# Patient Record
Sex: FEMALE | Race: WHITE | NOT HISPANIC OR LATINO | Employment: OTHER | URBAN - METROPOLITAN AREA
[De-identification: names, ages, dates, MRNs, and addresses within clinical notes are randomized per-mention and may not be internally consistent; named-entity substitution may affect disease eponyms.]

---
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
## 2020-10-18 ENCOUNTER — OFFICE VISIT (OUTPATIENT)
Dept: URGENT CARE | Facility: CLINIC | Age: 20
End: 2020-10-18
Payer: MEDICAID

## 2020-10-18 VITALS
HEART RATE: 98 BPM | TEMPERATURE: 98 F | WEIGHT: 150 LBS | SYSTOLIC BLOOD PRESSURE: 122 MMHG | HEIGHT: 67 IN | DIASTOLIC BLOOD PRESSURE: 76 MMHG | BODY MASS INDEX: 23.54 KG/M2 | OXYGEN SATURATION: 99 % | RESPIRATION RATE: 18 BRPM

## 2020-10-18 DIAGNOSIS — J06.9 UPPER RESPIRATORY VIRUS: Primary | ICD-10-CM

## 2020-10-18 DIAGNOSIS — H65.01 NON-RECURRENT ACUTE SEROUS OTITIS MEDIA OF RIGHT EAR: ICD-10-CM

## 2020-10-18 PROCEDURE — 99499 NO LOS: ICD-10-PCS | Mod: S$GLB,,, | Performed by: PHYSICIAN ASSISTANT

## 2020-10-18 PROCEDURE — 99214 OFFICE O/P EST MOD 30 MIN: CPT | Mod: S$GLB,,, | Performed by: PHYSICIAN ASSISTANT

## 2020-10-18 PROCEDURE — 99499 UNLISTED E&M SERVICE: CPT | Mod: S$GLB,,, | Performed by: PHYSICIAN ASSISTANT

## 2020-10-18 PROCEDURE — 99214 PR OFFICE/OUTPT VISIT, EST, LEVL IV, 30-39 MIN: ICD-10-PCS | Mod: S$GLB,,, | Performed by: PHYSICIAN ASSISTANT

## 2020-10-18 RX ORDER — LIDOCAINE HYDROCHLORIDE 20 MG/ML
SOLUTION OROPHARYNGEAL EVERY 6 HOURS
Qty: 100 ML | Refills: 0 | Status: SHIPPED | OUTPATIENT
Start: 2020-10-18 | End: 2020-10-25

## 2020-10-18 RX ORDER — PREDNISONE 20 MG/1
20 TABLET ORAL DAILY
Qty: 3 TABLET | Refills: 0 | Status: SHIPPED | OUTPATIENT
Start: 2020-10-18 | End: 2020-10-21

## 2020-10-18 RX ORDER — AMOXICILLIN 875 MG/1
875 TABLET, FILM COATED ORAL 2 TIMES DAILY
Qty: 20 TABLET | Refills: 0 | Status: SHIPPED | OUTPATIENT
Start: 2020-10-18 | End: 2020-10-28

## 2020-10-18 NOTE — PROGRESS NOTES
"Subjective:       Patient ID: Daniella Roa is a 20 y.o. female.    Vitals:  height is 5' 7" (1.702 m) and weight is 68 kg (150 lb). Her temperature is 97.9 °F (36.6 °C). Her blood pressure is 122/76 and her pulse is 98. Her respiration is 18 and oxygen saturation is 99%.     Chief Complaint: Sore Throat (1 week)    Ms. Roa presents for evaluation of ear pain, congestion, sinus pressure, and sore throat.  She has had the symptoms x 1 week.  She denies any fevers, chills, cough, shortness of breath, chest pain, leg swelling, nausea, vomiting, diarrhea.  She has been taking ibuprofen for symptoms with some relief.           Sore Throat   This is a new problem. The current episode started in the past 7 days. The problem has been unchanged. The pain is worse on the right side. There has been no fever. The pain is at a severity of 6/10. The pain is moderate. Associated symptoms include congestion, ear pain and swollen glands. Pertinent negatives include no coughing, ear discharge, headaches, neck pain, shortness of breath, stridor or vomiting. She has had no exposure to strep. She has tried NSAIDs for the symptoms. The treatment provided moderate relief.       Constitution: Negative for chills, sweating, fatigue and fever.   HENT: Positive for ear pain, congestion, sinus pressure and sore throat. Negative for ear discharge, postnasal drip, sinus pain and voice change.    Neck: Negative for neck pain and painful lymph nodes.   Cardiovascular: Negative for chest trauma, chest pain, leg swelling, palpitations, sob on exertion and passing out.   Eyes: Negative for eye redness.   Respiratory: Negative for chest tightness, cough, sputum production, bloody sputum, COPD, shortness of breath, stridor, wheezing and asthma.    Gastrointestinal: Negative for nausea and vomiting.   Musculoskeletal: Negative for muscle ache.   Skin: Negative for rash.   Allergic/Immunologic: Negative for seasonal allergies and asthma. "   Neurological: Negative for dizziness, history of vertigo, light-headedness, passing out, facial drooping, speech difficulty, coordination disturbances, loss of balance and headaches.   Hematologic/Lymphatic: Negative for swollen lymph nodes.       Objective:      Physical Exam   Constitutional: She is oriented to person, place, and time. She appears well-developed. She is cooperative.  Non-toxic appearance. She does not appear ill. No distress.   HENT:   Head: Normocephalic and atraumatic.   Ears:   Right Ear: Hearing, external ear and ear canal normal. Tympanic membrane is erythematous. A middle ear effusion is present.   Left Ear: Hearing, external ear and ear canal normal. Tympanic membrane is not erythematous. A middle ear effusion is present.   Nose: Rhinorrhea present. No mucosal edema or nasal deformity. No epistaxis. Right sinus exhibits no maxillary sinus tenderness and no frontal sinus tenderness. Left sinus exhibits no maxillary sinus tenderness and no frontal sinus tenderness.   Mouth/Throat: Uvula is midline and mucous membranes are normal. No trismus in the jaw. Normal dentition. No uvula swelling. Posterior oropharyngeal erythema present. No oropharyngeal exudate or posterior oropharyngeal edema. Tonsils are 2+ on the right. Tonsils are 2+ on the left. No tonsillar exudate.   Eyes: Conjunctivae and lids are normal. No scleral icterus.   Neck: Trachea normal, full passive range of motion without pain and phonation normal. Neck supple. No neck rigidity. No edema and no erythema present.   Cardiovascular: Normal rate, regular rhythm, normal heart sounds and normal pulses.   Pulmonary/Chest: Effort normal and breath sounds normal. No stridor. No respiratory distress. She has no decreased breath sounds. She has no wheezes. She has no rhonchi. She has no rales.   Abdominal: Normal appearance.   Musculoskeletal: Normal range of motion.         General: No deformity.   Lymphadenopathy:     She has no  cervical adenopathy.   Neurological: She is alert and oriented to person, place, and time. She exhibits normal muscle tone. Coordination normal.   Skin: Skin is warm, dry, intact, not diaphoretic and not pale. Psychiatric: Her speech is normal and behavior is normal. Judgment and thought content normal.   Nursing note and vitals reviewed.        Assessment:       1. Upper respiratory virus    2. Non-recurrent acute serous otitis media of right ear        Plan:         Upper respiratory virus    Non-recurrent acute serous otitis media of right ear    Other orders  -     amoxicillin (AMOXIL) 875 MG tablet; Take 1 tablet (875 mg total) by mouth 2 (two) times daily. for 10 days  Dispense: 20 tablet; Refill: 0  -     predniSONE (DELTASONE) 20 MG tablet; Take 1 tablet (20 mg total) by mouth once daily. for 3 days  Dispense: 3 tablet; Refill: 0  -     lidocaine HCl 2% (LIDOCAINE VISCOUS) 2 % Soln; by Mucous Membrane route every 6 (six) hours. 10 mL swished in the mouth and spit out no more frequently than every 3 hours for 7 days  Dispense: 100 mL; Refill: 0     Discussed risk of steroids with patient, understanding was verbalized.    Diagnoses and plan discussed with the patient, as well as the expected course and duration of her symptoms. All questions and concerns were addressed prior to discharge.  She was advised to follow up with her PCP within 1 week if symptoms do not improve. Emergency department precautions were given. Patient verbalized understanding and was happy with the plan of care.     Patient Instructions   PLEASE READ YOUR DISCHARGE INSTRUCTIONS ENTIRELY AS IT CONTAINS IMPORTANT INFORMATION.  - Rest.    - Drink plenty of fluids.    - Tylenol or Ibuprofen as directed as needed for fever/pain.    - If you were prescribed antibiotics, please take them to completion.  - If you are female and on birth control pills - please use additional methods of contraception to prevent pregnancy while on antibiotics and  for one cycle after.   - If you were prescribed a narcotic medication or muscle relaxer, do not drive or operate heavy equipment or machinery while taking these medications, as they can cause drowsiness.   - If you smoke, please stop smoking.  -You must understand that you've received an Urgent Care treatment only and that you may be released before all your medical problems are known or treated. You, the patient, will    arrange for follow up care as instructed. Please arrange follow up with your primary medical clinic as soon as possible.   - Follow up with your PCP or specialty clinic as directed in the next 1-2 weeks if not improved or as needed.  You can call [phone] to schedule an appointment with the appropriate provider.    - Please return to Urgent Care or to the Emergency Department if your symptoms worsen.    Patient aware and verbalized understanding.    Viral Upper Respiratory Illness (Adult)  You have a viral upper respiratory illness (URI), which is another term for the common cold. This illness is contagious during the first few days. It is spread through the air by coughing and sneezing. It may also be spread by direct contact (touching the sick person and then touching your own eyes, nose, or mouth). Frequent handwashing will decrease risk of spread. Most viral illnesses go away within 7 to 10 days with rest and simple home remedies. Sometimes the illness may last for several weeks. Antibiotics will not kill a virus, and they are generally not prescribed for this condition.    Home care  · If symptoms are severe, rest at home for the first 2 to 3 days. When you resume activity, don't let yourself get too tired.  · Avoid being exposed to cigarette smoke (yours or others).  · You may use acetaminophen or ibuprofen to control pain and fever, unless another medicine was prescribed. (Note: If you have chronic liver or kidney disease, have ever had a stomach ulcer or gastrointestinal bleeding, or  are taking blood-thinning medicines, talk with your healthcare provider before using these medicines.) Aspirin should never be given to anyone under 18 years of age who is ill with a viral infection or fever. It may cause severe liver or brain damage.  · Your appetite may be poor, so a light diet is fine. Avoid dehydration by drinking 6 to 8 glasses of fluids per day (water, soft drinks, juices, tea, or soup). Extra fluids will help loosen secretions in the nose and lungs.  · Over-the-counter cold medicines will not shorten the length of time youre sick, but they may be helpful for the following symptoms: cough, sore throat, and nasal and sinus congestion. (Note: Do not use decongestants if you have high blood pressure.)  Follow-up care  Follow up with your healthcare provider, or as advised.  When to seek medical advice  Call your healthcare provider right away if any of these occur:  · Cough with lots of colored sputum (mucus)  · Severe headache; face, neck, or ear pain  · Difficulty swallowing due to throat pain  · Fever of 100.4°F (38°C)  Call 911, or get immediate medical care  Call emergency services right away if any of these occur:  · Chest pain, shortness of breath, wheezing, or difficulty breathing  · Coughing up blood  · Inability to swallow due to throat pain  Date Last Reviewed: 9/13/2015  © 0220-1826 [employer]. [address] [place] [address]. All rights reserved. This information is not intended as a substitute for professional medical care. Always follow your healthcare professional's instructions.        Middle Ear Infection (Adult)  You have an infection of the middle ear, the space behind the eardrum. This is also called acute otitis media (AOM). Sometimes it is caused by the common cold. This is because congestion can block the internal passage (eustachian tube) that drains fluid from the middle ear. When the middle ear fills with fluid, bacteria can grow there and  cause an infection. Oral antibiotics are used to treat this illness, not ear drops. Symptoms usually start to improve within 1 to 2 days of treatment.    Home care  The following are general care guidelines:  · Finish all of the antibiotic medicine given, even though you may feel better after the first few days.  · You may use over-the-counter medicine, such as acetaminophen or ibuprofen, to control pain and fever, unless something else was prescribed. If you have chronic liver or kidney disease or have ever had a stomach ulcer or gastrointestinal bleeding, talk with your healthcare provider before using these medicines. Do not give aspirin to anyone under 18 years of age who has a fever. It may cause severe illness or death.  Follow-up care  Follow up with your healthcare provider, or as advised, in 2 weeks if all symptoms have not gotten better, or if hearing doesn't go back to normal within 1 month.  When to seek medical advice  Call your healthcare provider right away if any of these occur:  · Ear pain gets worse or does not improve after 3 days of treatment  · Unusual drowsiness or confusion  · Neck pain, stiff neck, or headache  · Fluid or blood draining from the ear canal  · Fever of 100.4°F (38°C) or as advised   · Seizure  Date Last Reviewed: 6/1/2016  © 9734-3908 [employer]. [address] [place] [address], [place] [address]. All rights reserved. This information is not intended as a substitute for professional medical care. Always follow your healthcare professional's instructions.

## 2020-10-18 NOTE — PATIENT INSTRUCTIONS
PLEASE READ YOUR DISCHARGE INSTRUCTIONS ENTIRELY AS IT CONTAINS IMPORTANT INFORMATION.  - Rest.    - Drink plenty of fluids.    - Tylenol or Ibuprofen as directed as needed for fever/pain.    - If you were prescribed antibiotics, please take them to completion.  - If you are female and on birth control pills - please use additional methods of contraception to prevent pregnancy while on antibiotics and for one cycle after.   - If you were prescribed a narcotic medication or muscle relaxer, do not drive or operate heavy equipment or machinery while taking these medications, as they can cause drowsiness.   - If you smoke, please stop smoking.  -You must understand that you've received an Urgent Care treatment only and that you may be released before all your medical problems are known or treated. You, the patient, will    arrange for follow up care as instructed. Please arrange follow up with your primary medical clinic as soon as possible.   - Follow up with your PCP or specialty clinic as directed in the next 1-2 weeks if not improved or as needed.  You can call [phone] to schedule an appointment with the appropriate provider.    - Please return to Urgent Care or to the Emergency Department if your symptoms worsen.    Patient aware and verbalized understanding.    Viral Upper Respiratory Illness (Adult)  You have a viral upper respiratory illness (URI), which is another term for the common cold. This illness is contagious during the first few days. It is spread through the air by coughing and sneezing. It may also be spread by direct contact (touching the sick person and then touching your own eyes, nose, or mouth). Frequent handwashing will decrease risk of spread. Most viral illnesses go away within 7 to 10 days with rest and simple home remedies. Sometimes the illness may last for several weeks. Antibiotics will not kill a virus, and they are generally not prescribed for this condition.    Home care  · If  symptoms are severe, rest at home for the first 2 to 3 days. When you resume activity, don't let yourself get too tired.  · Avoid being exposed to cigarette smoke (yours or others).  · You may use acetaminophen or ibuprofen to control pain and fever, unless another medicine was prescribed. (Note: If you have chronic liver or kidney disease, have ever had a stomach ulcer or gastrointestinal bleeding, or are taking blood-thinning medicines, talk with your healthcare provider before using these medicines.) Aspirin should never be given to anyone under 18 years of age who is ill with a viral infection or fever. It may cause severe liver or brain damage.  · Your appetite may be poor, so a light diet is fine. Avoid dehydration by drinking 6 to 8 glasses of fluids per day (water, soft drinks, juices, tea, or soup). Extra fluids will help loosen secretions in the nose and lungs.  · Over-the-counter cold medicines will not shorten the length of time youre sick, but they may be helpful for the following symptoms: cough, sore throat, and nasal and sinus congestion. (Note: Do not use decongestants if you have high blood pressure.)  Follow-up care  Follow up with your healthcare provider, or as advised.  When to seek medical advice  Call your healthcare provider right away if any of these occur:  · Cough with lots of colored sputum (mucus)  · Severe headache; face, neck, or ear pain  · Difficulty swallowing due to throat pain  · Fever of 100.4°F (38°C)  Call 911, or get immediate medical care  Call emergency services right away if any of these occur:  · Chest pain, shortness of breath, wheezing, or difficulty breathing  · Coughing up blood  · Inability to swallow due to throat pain  Date Last Reviewed: 9/13/2015  © 6360-0574 [employer]. [address] [place] [address], [place] [address]. All rights reserved. This information is not intended as a substitute for professional medical care. Always follow your healthcare  professional's instructions.        Middle Ear Infection (Adult)  You have an infection of the middle ear, the space behind the eardrum. This is also called acute otitis media (AOM). Sometimes it is caused by the common cold. This is because congestion can block the internal passage (eustachian tube) that drains fluid from the middle ear. When the middle ear fills with fluid, bacteria can grow there and cause an infection. Oral antibiotics are used to treat this illness, not ear drops. Symptoms usually start to improve within 1 to 2 days of treatment.    Home care  The following are general care guidelines:  · Finish all of the antibiotic medicine given, even though you may feel better after the first few days.  · You may use over-the-counter medicine, such as acetaminophen or ibuprofen, to control pain and fever, unless something else was prescribed. If you have chronic liver or kidney disease or have ever had a stomach ulcer or gastrointestinal bleeding, talk with your healthcare provider before using these medicines. Do not give aspirin to anyone under 18 years of age who has a fever. It may cause severe illness or death.  Follow-up care  Follow up with your healthcare provider, or as advised, in 2 weeks if all symptoms have not gotten better, or if hearing doesn't go back to normal within 1 month.  When to seek medical advice  Call your healthcare provider right away if any of these occur:  · Ear pain gets worse or does not improve after 3 days of treatment  · Unusual drowsiness or confusion  · Neck pain, stiff neck, or headache  · Fluid or blood draining from the ear canal  · Fever of 100.4°F (38°C) or as advised   · Seizure  Date Last Reviewed: 6/1/2016 © 2000-2017 [employer]. [address] [place] [address], [place] [address]. All rights reserved. This information is not intended as a substitute for professional medical care. Always follow your healthcare professional's instructions.

## 2021-05-10 ENCOUNTER — PATIENT MESSAGE (OUTPATIENT)
Dept: RESEARCH | Facility: HOSPITAL | Age: 21
End: 2021-05-10

## 2023-11-20 PROBLEM — M25.60 RANGE OF MOTION DEFICIT: Status: RESOLVED | Noted: 2018-03-26 | Resolved: 2023-11-20

## 2023-11-20 PROBLEM — L85.8 KERATOSIS PILARIS: Status: ACTIVE | Noted: 2023-11-20

## 2023-11-20 PROBLEM — G43.119 INTRACTABLE MIGRAINE WITH AURA WITHOUT STATUS MIGRAINOSUS: Status: ACTIVE | Noted: 2023-11-20

## 2024-02-19 ENCOUNTER — OFFICE VISIT (OUTPATIENT)
Dept: DERMATOLOGY | Facility: CLINIC | Age: 24
End: 2024-02-19
Payer: OTHER GOVERNMENT

## 2024-02-19 DIAGNOSIS — R22.9 SUBCUTANEOUS NODULE: Primary | ICD-10-CM

## 2024-02-19 DIAGNOSIS — L85.8 KERATOSIS PILARIS RUBRA: ICD-10-CM

## 2024-02-19 PROCEDURE — 99203 OFFICE O/P NEW LOW 30 MIN: CPT | Mod: AQ,S$GLB,, | Performed by: STUDENT IN AN ORGANIZED HEALTH CARE EDUCATION/TRAINING PROGRAM

## 2024-02-19 RX ORDER — TRIAMCINOLONE ACETONIDE 0.25 MG/G
CREAM TOPICAL 2 TIMES DAILY
Qty: 80 G | Refills: 1 | Status: SHIPPED | OUTPATIENT
Start: 2024-02-19

## 2024-02-19 RX ORDER — AMMONIUM LACTATE 12 G/100G
LOTION TOPICAL NIGHTLY
Qty: 225 G | Refills: 1 | Status: SHIPPED | OUTPATIENT
Start: 2024-02-19

## 2024-02-19 NOTE — PROGRESS NOTES
Subjective:      Patient ID:  Daniella Roa is a 23 y.o. female who presents for   Chief Complaint   Patient presents with    Rash     arms     New patient     Patient here today for rash on arms and legs x years. Uses urea lotion, does not help much.     Also complains of spot on right thigh x months. Some tenderness to touch.       Review of Systems   Constitutional:  Negative for fever, chills and fatigue.   Respiratory:  Negative for cough and shortness of breath.    Gastrointestinal:  Negative for nausea and vomiting.   Skin:  Positive for rash.       Objective:   Physical Exam   Constitutional: She appears well-developed and well-nourished.   Neurological: She is alert and oriented to person, place, and time.   Psychiatric: She has a normal mood and affect.   Skin:   Areas Examined (abnormalities noted in diagram):   RUE Inspected  LUE Inspection Performed  RLE Inspected  LLE Inspection Performed            Diagram Legend     Erythematous scaling macule/papule c/w actinic keratosis       Vascular papule c/w angioma      Pigmented verrucoid papule/plaque c/w seborrheic keratosis      Yellow umbilicated papule c/w sebaceous hyperplasia      Irregularly shaped tan macule c/w lentigo     1-2 mm smooth white papules consistent with Milia      Movable subcutaneous cyst with punctum c/w epidermal inclusion cyst      Subcutaneous movable cyst c/w pilar cyst      Firm pink to brown papule c/w dermatofibroma      Pedunculated fleshy papule(s) c/w skin tag(s)      Evenly pigmented macule c/w junctional nevus     Mildly variegated pigmented, slightly irregular-bordered macule c/w mildly atypical nevus      Flesh colored to evenly pigmented papule c/w intradermal nevus       Pink pearly papule/plaque c/w basal cell carcinoma      Erythematous hyperkeratotic cursted plaque c/w SCC      Surgical scar with no sign of skin cancer recurrence      Open and closed comedones      Inflammatory papules and pustules       Verrucoid papule consistent consistent with wart     Erythematous eczematous patches and plaques     Dystrophic onycholytic nail with subungual debris c/w onychomycosis     Umbilicated papule    Erythematous-base heme-crusted tan verrucoid plaque consistent with inflamed seborrheic keratosis     Erythematous Silvery Scaling Plaque c/w Psoriasis     See annotation      Assessment / Plan:        Subcutaneous nodule  -     Cancel: US Soft Tissue, Lower Extremity, Right; Future; Expected date: 02/19/2024  -     US Soft Tissue, Lower Extremity, Right; Future; Expected date: 02/19/2024  Right thigh  Dx: cyst, pilomatricoma vs. Due to blue hue and very soft would like hemangioma - will get ultrasound- will refer to general surgery if vascular  Keratosis pilaris rubra  -     Ambulatory referral/consult to Dermatology  -     ammonium lactate (LAC-HYDRIN) 12 % lotion; Apply topically every evening.  Dispense: 225 g; Refill: 1  -     triamcinolone acetonide 0.025% (KENALOG) 0.025 % cream; Apply topically 2 (two) times daily.  Dispense: 80 g; Refill: 1  Extensive on arms and legs  Discussed chronicity of condition, no cure  Trial of ammonium lactate lotion nightly- safe to use long term  Tac cream BID x 10 days on AA for redness, irritation           No follow-ups on file.

## 2024-11-14 PROBLEM — D72.829 LEUKOCYTOSIS: Status: ACTIVE | Noted: 2024-11-14

## 2024-11-14 PROBLEM — R79.89 ELEVATED TROPONIN: Status: ACTIVE | Noted: 2024-11-14

## 2024-11-14 PROBLEM — I47.10 SVT (SUPRAVENTRICULAR TACHYCARDIA): Status: ACTIVE | Noted: 2024-11-14

## 2024-11-15 DIAGNOSIS — I47.10 SVT (SUPRAVENTRICULAR TACHYCARDIA): Primary | ICD-10-CM

## 2024-11-15 PROBLEM — R00.0 TACHYCARDIA: Status: ACTIVE | Noted: 2024-11-15

## 2024-11-18 ENCOUNTER — HOSPITAL ENCOUNTER (OUTPATIENT)
Dept: CARDIOLOGY | Facility: HOSPITAL | Age: 24
Discharge: HOME | End: 2024-11-18
Attending: INTERNAL MEDICINE
Payer: OTHER GOVERNMENT

## 2024-11-18 DIAGNOSIS — I47.10 SVT (SUPRAVENTRICULAR TACHYCARDIA): ICD-10-CM

## 2024-11-18 PROCEDURE — 93271 ECG/MONITORING AND ANALYSIS: CPT | Mod: PO

## 2024-11-27 NOTE — PROGRESS NOTES
SUBJECTIVE:    Patient ID:  Daniella Roa is a 24 y.o. female who presents for evaluation of SVT      Medical history:  SVT  Palpitations  Migraine HA    Patient was referred by Dr. Messi Goode  Patient is in [employer]    Patient presents to the emergency department complaints of palpitations and lightheadedness.  She is found to be in SVT.  She was given adenosine x3 as well as IV metoprolol without conversion.  They were about to cardiovert her when she converted back to sinus rhythm.  Baseline ECG did not show any pre-excitation.  SVT with a short RP tachycardia however ERP was greater than 60 milliseconds.    She was discharged on verapamil    TTE with normal BiV function    11/27/2024  Reports having palpitations that started approximately 1 year ago   She reports episodes that occur approximately once a month which can last up to 30 minutes.  She tried vagal maneuvers without success.  She reports feeling very fatigued with verapamil    I personally reviewed the ECG/telemetry strip today. My interpretation is normal sinus rhythm 64 beats per minute with a normal KS interval of 148 milliseconds.  QT interval normal at 408 milliseconds.    Past Medical History:   Diagnosis Date    Allergy     Kidney stones        Past Surgical History:   Procedure Laterality Date    TONSILLECTOMY         Family History   Problem Relation Name Age of Onset    Cancer Paternal Grandfather         Social History     Socioeconomic History    Marital status:    Occupational History    Occupation: Student   Tobacco Use    Smoking status: Never    Smokeless tobacco: Never   Substance and Sexual Activity    Alcohol use: Never    Drug use: Never    Sexual activity: Yes     Comment: BC pills     Social Drivers of Health     Financial Resource Strain: Low Risk  (11/14/2024)    Overall Financial Resource Strain (CARDIA)     Difficulty of Paying Living Expenses: Not hard at all   Food Insecurity: No Food Insecurity  (11/15/2024)    Hunger Vital Sign     Worried About Running Out of Food in the Last Year: Never true     Ran Out of Food in the Last Year: Never true   Transportation Needs: No Transportation Needs (11/15/2024)    TRANSPORTATION NEEDS     Transportation : No   Physical Activity: Sufficiently Active (11/14/2024)    Exercise Vital Sign     Days of Exercise per Week: 4 days     Minutes of Exercise per Session: 60 min   Stress: Stress Concern Present (11/14/2024)    [employer] - Occupational Stress Questionnaire     Feeling of Stress : Rather much   Housing Stability: Low Risk  (11/15/2024)    Housing Stability Vital Sign     Unable to Pay for Housing in the Last Year: No     Homeless in the Last Year: No       Review of patient's allergies indicates:  No Known Allergies    Review of Systems   Constitutional: Negative for chills and fever.   HENT:  Negative for congestion and hearing loss.    Eyes:  Negative for blurred vision and double vision.   Cardiovascular:         See HPI   Respiratory:  Negative for cough, hemoptysis and shortness of breath.    Endocrine: Negative for cold intolerance and heat intolerance.   Musculoskeletal:  Negative for joint pain and joint swelling.   Gastrointestinal:  Negative for abdominal pain, nausea and vomiting.   Genitourinary:  Negative for dysuria and hematuria.   Neurological:  Negative for focal weakness and headaches.   Psychiatric/Behavioral:  Negative for altered mental status.    All other systems reviewed and are negative.           OBJECTIVE:     Vitals:    11/29/24 1320   BP: 138/88   Pulse: 71         BP Readings from Last 5 Encounters:   11/18/24 100/60   11/15/24 (!) 123/90   07/27/24 120/78   01/04/24 (!) 103/50   11/20/23 118/72        Physical Exam  Vitals and nursing note reviewed.   Constitutional:       General: She is not in acute distress.     Appearance: She is well-developed. She is not diaphoretic.   HENT:      Head: Normocephalic  and atraumatic.   Eyes:      General: No scleral icterus.        Right eye: No discharge.         Left eye: No discharge.   Cardiovascular:      Rate and Rhythm: Normal rate and regular rhythm. No extrasystoles are present.     Pulses: Normal pulses and intact distal pulses.           Radial pulses are 2+ on the right side and 2+ on the left side.      Heart sounds: Normal heart sounds, S1 normal and S2 normal. Heart sounds not distant. No opening snap. No murmur heard.     No friction rub. No gallop. No S3 or S4 sounds.   Pulmonary:      Effort: Pulmonary effort is normal. No respiratory distress.      Breath sounds: No wheezing or rales.   Abdominal:      General: Bowel sounds are normal. There is no distension.      Palpations: Abdomen is soft.      Tenderness: There is no abdominal tenderness.   Musculoskeletal:         General: No deformity. Normal range of motion.      Cervical back: Normal range of motion and neck supple.   Skin:     General: Skin is warm and dry.      Findings: No erythema or rash.   Neurological:      Mental Status: She is alert.             Current Outpatient Medications   Medication Instructions    FLUoxetine 10 mg, Oral, Daily    verapamiL (CALAN-SR) 120 mg, Oral, Daily       Lipid Panel:   Lab Results   Component Value Date    CHOL 195 11/20/2023    HDL 50 11/20/2023    LDLCALC 133.0 11/20/2023    TRIG 60 11/20/2023    CHOLHDL 25.6 11/20/2023         The ASCVD Risk score (Jil DK, et al., 2019) failed to calculate for the following reasons:    The 2019 ASCVD risk score is only valid for ages 40 to 79    Most Recent EKG Results  Results for orders placed or performed during the hospital encounter of 11/14/24   EKG 12-lead    Collection Time: 11/14/24  9:20 PM   Result Value Ref Range    QRS Duration 90 ms    OHS QTC Calculation 390 ms    Narrative    Test Reason : I47.10,    Vent. Rate :  82 BPM     Atrial Rate :  82 BPM     P-R Int : 200 ms          QRS Dur :  90 ms      QT Int : 334  ms       P-R-T Axes :  73  71  45 degrees    QTcB Int : 390 ms    Normal sinus rhythm with sinus arrhythmia  Normal ECG  When compared with ECG of 14-Nov-2024 20:32,  SInus rhythm has replaced SVT. No pre-excitation seen.   Confirmed by Rafael Ray ([id number]) on 11/15/2024 10:54:15 AM    Referred By: AAAREFERRAL SELF           Confirmed By: Rafael Ray       Most Recent Echocardiogram Results  Results for orders placed during the hospital encounter of 11/14/24    Echo    Interpretation Summary    Left Ventricle: The left ventricle is normal in size. Normal wall thickness. There is normal systolic function. Ejection fraction is approximately 60%. There is normal diastolic function.    Right Ventricle: Normal right ventricular cavity size. Wall thickness is normal. Systolic function is normal.    Pulmonary Artery: The estimated pulmonary artery systolic pressure is 18 mmHg.    IVC/SVC: Normal venous pressure at 3 mmHg.      Most Recent Nuclear Stress Test Results  No results found for this or any previous visit.      Most Recent Cardiac PET Stress Test Results  No results found for this or any previous visit.      Most Recent Cardiovascular Angiogram results  No results found for this or any previous visit.      Other Most Recent Cardiology Results  Results for orders placed during the hospital encounter of 11/14/24    Cardiac monitoring strips        All pertinent data including labs, imaging, EKGs, and studies listed above were reviewed.  All EKG tracing in [facility] were personally interpreted by this provider.    ASSESSMENT:   Daniella Roa is a 24 y.o. female who presents for evaluation of SVT.  ECG showed heart rate of 102 beats per minute with a short RP interval but not less than 60 milliseconds.    The pathophysiology and types of SVT explained to the patient which includes AVNRT, AVRT, AT, or JT. We discussed options including alternative medical regimen versus EPS and ablation of SVT. The risks, benefits and  alternatives of medications and procedure were explained to the patient, patient's family and/or surrogate decision maker. Risks of an EPS and ablation were discussed which includes (but not limited to) bleeding, infection, pain, vascular damage [hematoma, AV fistula, pseudoaneurysm] or damage to structures surrounding the vasculature which could require surgical intervention, myocardial damage [perforation, valvular damage], cardiac tamponade, phrenic nerve damage if ablation in vunerable area, injury to conduction system which may require permanent pacemaker implantation, CVA, MI, and death. We discussed the potential need for transseptal access and the added risk involved with doing so. All questions were answered and patient is understanding of these risks.    After our discussion, she would like to proceed     1. SVT (supraventricular tachycardia)        2. Elevated troponin        3. Tachycardia          PLAN FOR TREATMENT OF ABOVE DIAGNOSES:     SVT EP study and ablation (12/17/24)  Stopped taking verapamil.  Can take p.r.n. if recurrent SVT  NPO at midnight   RIP  Anesthesia    F/u 6 weeks post-ablation    Silver Ray MD  Cardiac Electrophysiology

## 2024-11-29 ENCOUNTER — OFFICE VISIT (OUTPATIENT)
Dept: CARDIOLOGY | Facility: CLINIC | Age: 24
End: 2024-11-29
Payer: OTHER GOVERNMENT

## 2024-11-29 VITALS
HEART RATE: 71 BPM | BODY MASS INDEX: 30.54 KG/M2 | SYSTOLIC BLOOD PRESSURE: 138 MMHG | HEIGHT: 68 IN | DIASTOLIC BLOOD PRESSURE: 88 MMHG | WEIGHT: 201.5 LBS

## 2024-11-29 DIAGNOSIS — R79.89 ELEVATED TROPONIN: ICD-10-CM

## 2024-11-29 DIAGNOSIS — R00.0 TACHYCARDIA: ICD-10-CM

## 2024-11-29 DIAGNOSIS — I47.10 SVT (SUPRAVENTRICULAR TACHYCARDIA): Primary | ICD-10-CM

## 2024-11-29 LAB
OHS QRS DURATION: 84 MS
OHS QTC CALCULATION: 408 MS

## 2024-11-29 PROCEDURE — 99213 OFFICE O/P EST LOW 20 MIN: CPT | Mod: PBBFAC,25,PO | Performed by: INTERNAL MEDICINE

## 2024-11-29 PROCEDURE — 99999 PR PBB SHADOW E&M-EST. PATIENT-LVL III: CPT | Mod: PBBFAC,,, | Performed by: INTERNAL MEDICINE

## 2024-11-29 PROCEDURE — 93005 ELECTROCARDIOGRAM TRACING: CPT | Mod: PO

## 2024-11-29 NOTE — PATIENT INSTRUCTIONS
Ablation    Arrive for your procedure at [facility] on December 17th at 11am (Approx arrival for 9am)    You will receive a phone call from [facility] Pre-Op Department with further instructions and exact arrival time prior to your scheduled procedure.    Fasting: You MAY NOT have anything to eat or drink AFTER MIDNIGHT the day of your procedure. If your procedure is scheduled in the afternoon you may have a LIGHT BREAKFAST (plain toast, black coffee or tea).    Medications:    Stop taking Verapamil.    Follow up on 2/3 at 2:15pm with Dr. Ray      You will need to arrive approximately 1.5 hours prior to your procedure at the hospital. Please arrange to have transportation home from the hospital. You WILL NOT BE ABLE TO DRIVE YOURSELF HOME.     You can expect to be at the hospital anywhere from 5-8 hours.

## 2024-12-03 ENCOUNTER — PATIENT MESSAGE (OUTPATIENT)
Dept: CARDIOLOGY | Facility: CLINIC | Age: 24
End: 2024-12-03
Payer: OTHER GOVERNMENT

## 2024-12-18 ENCOUNTER — PATIENT MESSAGE (OUTPATIENT)
Dept: CARDIOLOGY | Facility: CLINIC | Age: 24
End: 2024-12-18
Payer: OTHER GOVERNMENT

## 2024-12-18 PROBLEM — K68.3 RETROPERITONEAL HEMATOMA: Status: ACTIVE | Noted: 2024-12-18

## 2024-12-20 ENCOUNTER — DOCUMENTATION ONLY (OUTPATIENT)
Dept: CARDIOLOGY | Facility: CLINIC | Age: 24
End: 2024-12-20
Payer: OTHER GOVERNMENT

## 2024-12-21 NOTE — PROGRESS NOTES
Patient was seen yesterday morning in the hospital. Repeat H&H stable. Vascular ultrasound (venous and arterial) without any acute findings. She was discharged home. I called and spoke with Ms. Roa this evening. She reports still having a fair amount of pain in the left groin which is exacerbated with taking in deep breaths. She has been sleeping a good bit. I discussed with her red flags to look out for or reasons to contact me immediately. She is scheduled to see her PCP next week. Would recommend repeat H&H to insure stability. Recommended heat pad 10-15 minutes 4x per day to help with discomfort and healing. I encouraged her to ambulate as tolerated. She will be seen in clinic in early January. I instructed her to reach out to me if she does not continue to improve or if anything changes.     Silver Ray MD  Cardiac electrophysiology

## 2024-12-23 ENCOUNTER — PATIENT MESSAGE (OUTPATIENT)
Dept: CARDIOLOGY | Facility: CLINIC | Age: 24
End: 2024-12-23
Payer: OTHER GOVERNMENT

## 2025-01-02 ENCOUNTER — OFFICE VISIT (OUTPATIENT)
Dept: CARDIOLOGY | Facility: CLINIC | Age: 25
End: 2025-01-02
Payer: MEDICAID

## 2025-01-02 VITALS
DIASTOLIC BLOOD PRESSURE: 73 MMHG | WEIGHT: 203.5 LBS | HEART RATE: 76 BPM | HEIGHT: 68 IN | BODY MASS INDEX: 30.84 KG/M2 | SYSTOLIC BLOOD PRESSURE: 119 MMHG

## 2025-01-02 DIAGNOSIS — K68.3 RETROPERITONEAL HEMATOMA: ICD-10-CM

## 2025-01-02 DIAGNOSIS — R00.0 TACHYCARDIA: ICD-10-CM

## 2025-01-02 DIAGNOSIS — I47.10 SVT (SUPRAVENTRICULAR TACHYCARDIA): Primary | ICD-10-CM

## 2025-01-02 DIAGNOSIS — Z98.890 H/O CARDIAC RADIOFREQUENCY ABLATION: ICD-10-CM

## 2025-01-02 PROCEDURE — 99999 PR PBB SHADOW E&M-EST. PATIENT-LVL III: CPT | Mod: PBBFAC,,, | Performed by: INTERNAL MEDICINE

## 2025-01-02 PROCEDURE — 99213 OFFICE O/P EST LOW 20 MIN: CPT | Mod: PBBFAC,PO | Performed by: INTERNAL MEDICINE

## 2025-01-02 NOTE — PROGRESS NOTES
SUBJECTIVE:    Patient ID:  Daniella Roa is a 24 y.o. female who presents for follow of SVT    Medical history:  SVT  Palpitations  Migraine HA     Patient was referred by Dr. Messi Goode  Patient is in the [employer]     Patient presents to the emergency department complaints of palpitations and lightheadedness.  She is found to be in SVT.  She was given adenosine x3 as well as IV metoprolol without conversion.  They were about to cardiovert her when she converted back to sinus rhythm.  Baseline ECG did not show any pre-excitation.  SVT with a short RP tachycardia however ERP was greater than 60 milliseconds.     She was discharged on verapamil     TTE with normal BiV function     11/27/2024  Reports having palpitations that started approximately 1 year ago   She reports episodes that occur approximately once a month which can last up to 30 minutes.  She tried vagal maneuvers without success.  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  No known recurrent arrhythmias since ablation.  She unfortunately had a right-sided groin and retroperitoneal hematoma which is resolving.  Vascular imaging showed no evidence of pseudoaneurysm or AV fistula.  She did not receive any blood products.  On her most recent labs her hemoglobin was increasing.    I personally reviewed the ECG/telemetry strip today. My interpretation is normal sinus rhythm 74 beats per minute.  Normal intervals.    Past Medical History:   Diagnosis Date    Allergy     Kidney stones     Supraventricular tachycardia        Past Surgical History:   Procedure Laterality Date    ABLATION OF ARRHYTHMOGENIC FOCUS FOR SUPRAVENTRICULAR TACHYCARDIA WITH ELECTROPHYSIOLOGY STUDY  12/17/2024    Procedure: Study possible ablation SVT EP STUDY;  Surgeon: Rafael Ray MD;  Location: [facility] CATH;  Service: Cardiology;;     TONSILLECTOMY         Family History   Problem Relation Name Age of Onset    Cancer Paternal Grandfather         Social History     Socioeconomic History    Marital status:    Occupational History    Occupation: Student   Tobacco Use    Smoking status: Never    Smokeless tobacco: Never   Substance and Sexual Activity    Alcohol use: Never    Drug use: Never    Sexual activity: Yes     Comment: BC pills     Social Drivers of Health     Financial Resource Strain: Low Risk  (12/18/2024)    Overall Financial Resource Strain (CARDIA)     Difficulty of Paying Living Expenses: Not hard at all   Food Insecurity: No Food Insecurity (12/18/2024)    Hunger Vital Sign     Worried About Running Out of Food in the Last Year: Never true     Ran Out of Food in the Last Year: Never true   Transportation Needs: No Transportation Needs (12/18/2024)    TRANSPORTATION NEEDS     Transportation : No   Physical Activity: Sufficiently Active (11/14/2024)    Exercise Vital Sign     Days of Exercise per Week: 4 days     Minutes of Exercise per Session: 60 min   Stress: Stress Concern Present (12/18/2024)    [employer] - Occupational Stress Questionnaire     Feeling of Stress : Rather much   Housing Stability: Low Risk  (12/18/2024)    Housing Stability Vital Sign     Unable to Pay for Housing in the Last Year: No     Homeless in the Last Year: No       Review of patient's allergies indicates:  No Known Allergies    Review of Systems   Constitutional: Negative for chills and fever.   HENT:  Negative for congestion and hearing loss.    Eyes:  Negative for blurred vision and double vision.   Cardiovascular:         See HPI   Respiratory:  Negative for cough, hemoptysis and shortness of breath.    Endocrine: Negative for cold intolerance and heat intolerance.   Musculoskeletal:  Negative for joint pain and joint swelling.   Gastrointestinal:  Negative for abdominal pain, nausea and vomiting.   Genitourinary:   "Negative for dysuria and hematuria.   Neurological:  Negative for focal weakness and headaches.   Psychiatric/Behavioral:  Negative for altered mental status.    All other systems reviewed and are negative.         OBJECTIVE:     Vitals:    01/02/25 1054   BP: 119/73   BP Location: Right arm   Patient Position: Sitting   Pulse: 76   Weight: 92.3 kg (203 lb 7.8 oz)   Height: 5' 8" (1.727 m)       BP Readings from Last 5 Encounters:   01/02/25 119/73   12/24/24 110/66   12/19/24 (!) 93/58   12/17/24 (!) 111/58   12/16/24 110/62        Physical Exam  Vitals and nursing note reviewed.   Constitutional:       General: She is not in acute distress.     Appearance: She is well-developed. She is not diaphoretic.   HENT:      Head: Normocephalic and atraumatic.   Eyes:      General: No scleral icterus.        Right eye: No discharge.         Left eye: No discharge.   Cardiovascular:      Rate and Rhythm: Normal rate and regular rhythm. No extrasystoles are present.     Pulses: Normal pulses and intact distal pulses.           Radial pulses are 2+ on the right side and 2+ on the left side.      Heart sounds: Normal heart sounds, S1 normal and S2 normal. Heart sounds not distant. No opening snap. No murmur heard.     No friction rub. No gallop. No S3 or S4 sounds.      Comments: Bilateral groin sites assess.  Mild tenderness to right groin region.  There was no hematoma.  Very minimal bruising.  Pulmonary:      Effort: Pulmonary effort is normal. No respiratory distress.      Breath sounds: No wheezing or rales.   Abdominal:      General: Bowel sounds are normal. There is no distension.      Palpations: Abdomen is soft.      Tenderness: There is no abdominal tenderness.   Musculoskeletal:         General: No deformity. Normal range of motion.      Cervical back: Normal range of motion and neck supple.   Skin:     General: Skin is warm and dry.      Findings: No erythema or rash.   Neurological:      Mental Status: She is " alert.             Current Outpatient Medications   Medication Instructions    citalopram (CELEXA) 10 mg, Oral, Daily    verapamiL (VERELAN) 120 mg, Daily PRN       Lipid Panel:   Lab Results   Component Value Date    CHOL 195 11/20/2023    HDL 50 11/20/2023    LDLCALC 133.0 11/20/2023    TRIG 60 11/20/2023    CHOLHDL 25.6 11/20/2023       The ASCVD Risk score (Jil RAYMOND, et al., 2019) failed to calculate for the following reasons:    The 2019 ASCVD risk score is only valid for ages 40 to 79    Most Recent EKG Results  Results for orders placed or performed during the hospital encounter of 12/17/24   EKG 12-lead    Collection Time: 12/17/24  4:56 PM   Result Value Ref Range    QRS Duration 90 ms    OHS QTC Calculation 411 ms    Narrative    Test Reason : Z98.890,Z86.79,    Vent. Rate :  59 BPM     Atrial Rate :  59 BPM     P-R Int : 138 ms          QRS Dur :  90 ms      QT Int : 416 ms       P-R-T Axes :  23  70  47 degrees    QTcB Int : 411 ms    Sinus bradycardia  Otherwise normal ECG  When compared with ECG of 17-Dec-2024 10:20,  No significant change was found  Confirmed by Serge Ricketts ([id number]) on 12/18/2024 9:46:00 PM    Referred By: Rafael Ray           Confirmed By: Serge Remy       Most Recent Echocardiogram Results  Results for orders placed during the hospital encounter of 11/14/24    Echo    Interpretation Summary    Left Ventricle: The left ventricle is normal in size. Normal wall thickness. There is normal systolic function. Ejection fraction is approximately 60%. There is normal diastolic function.    Right Ventricle: Normal right ventricular cavity size. Wall thickness is normal. Systolic function is normal.    Pulmonary Artery: The estimated pulmonary artery systolic pressure is 18 mmHg.    IVC/SVC: Normal venous pressure at 3 mmHg.      Most Recent Nuclear Stress Test Results  No results found for this or any previous visit.      Most Recent Cardiac PET Stress Test  Results  No results found for this or any previous visit.      Most Recent Cardiovascular Angiogram results  No results found for this or any previous visit.      Other Most Recent Cardiology Results  Results for orders placed during the hospital encounter of 12/18/24    Cardiac monitoring strips        All pertinent data including labs, imaging, EKGs, and studies listed above were reviewed.  All of the patients ECG tracings since most recent visit were personally interpreted by this provider    ASSESSMENT:   Daniella Roa is a 24 y.o. female who presents for follow of SVT.  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  No recurrent arrhythmias.  Right groin/retroperitoneal hematoma improving.  No evidence of vascular injury on CT or ultrasound.  Off all cardiac meds.    1. SVT (supraventricular tachycardia)  IN OFFICE EKG 12-LEAD (to Muse)      2. Retroperitoneal hematoma        3. H/O cardiac radiofrequency ablation        4. Tachycardia          PLAN FOR TREATMENT OF ABOVE DIAGNOSES:     Discontinue verapamil  Discussed activity restrictions.  Would like for her to avoid exercising until pain-free with ambulation.    F/u 1 year (sooner if needed)    Silver Ray MD  Cardiac Electrophysiology    This note was partially generated using voice recognition and generative artificial intelligence software. While every effort has been made to ensure its accuracy, transcription errors may exist. Please reach out to me with any questions or if clarification is needed.

## 2025-07-07 PROBLEM — M25.512 LEFT SHOULDER PAIN: Status: ACTIVE | Noted: 2025-07-07

## 2025-07-07 PROBLEM — M25.561 KNEE PAIN, BILATERAL: Status: ACTIVE | Noted: 2025-07-07

## 2025-07-07 PROBLEM — M25.562 KNEE PAIN, BILATERAL: Status: ACTIVE | Noted: 2025-07-07

## 2025-07-11 ENCOUNTER — OFFICE VISIT (OUTPATIENT)
Dept: URGENT CARE | Facility: CLINIC | Age: 25
End: 2025-07-11
Payer: OTHER GOVERNMENT

## 2025-07-11 VITALS
SYSTOLIC BLOOD PRESSURE: 125 MMHG | HEIGHT: 68 IN | WEIGHT: 200 LBS | OXYGEN SATURATION: 98 % | RESPIRATION RATE: 18 BRPM | TEMPERATURE: 99 F | BODY MASS INDEX: 30.31 KG/M2 | DIASTOLIC BLOOD PRESSURE: 87 MMHG | HEART RATE: 88 BPM

## 2025-07-11 DIAGNOSIS — H66.91 RIGHT OTITIS MEDIA, UNSPECIFIED OTITIS MEDIA TYPE: ICD-10-CM

## 2025-07-11 DIAGNOSIS — J02.9 SORE THROAT: ICD-10-CM

## 2025-07-11 DIAGNOSIS — U07.1 COVID-19 VIRUS INFECTION: Primary | ICD-10-CM

## 2025-07-11 LAB
CTP QC/QA: YES
CTP QC/QA: YES
MOLECULAR STREP A: NEGATIVE
SARS-COV+SARS-COV-2 AG RESP QL IA.RAPID: POSITIVE

## 2025-07-11 RX ORDER — AMOXICILLIN 500 MG/1
500 TABLET, FILM COATED ORAL EVERY 12 HOURS
Qty: 14 TABLET | Refills: 0 | Status: SHIPPED | OUTPATIENT
Start: 2025-07-11 | End: 2025-07-18

## 2025-07-11 NOTE — PATIENT INSTRUCTIONS
INSTRUCTIONS:  - Rest.  - Drink plenty of fluids.  - Take Tylenol and/or Ibuprofen as directed as needed for fever/pain.  Do not take more than the recommended dose.  - follow up with your PCP within the next 1-2 weeks as needed.  - You must understand that you have received an Urgent Care treatment only and that you may be released before all of your medical problems are known or treated.   - You, the patient, will arrange for follow up care as instructed.   - If your condition worsens or fails to improve we recommend that you receive another evaluation at the ER immediately or contact your PCP to discuss your concerns.   - You can call [phone] or [phone] to help schedule an appointment with the appropriate provider.     -If you smoke cigarettes, it would be beneficial for you to stop.    OVER THE COUNTER RECOMMENDATIONS/SUGGESTIONS.     Make sure to stay well hydrated.     Use Nasal Saline to mechanically move any post nasal drip from your eustachian tube or from the back of your throat.     Use warm salt water gargles to ease your throat pain. Warm salt water gargles as needed for sore throat-  1/2 tsp salt to 1 cup warm water, gargle as desired.     Use an antihistamine such as Claritin, Zyrtec or Allegra to dry you out.      Use pseudoephedrine (behind the counter) to decongest. Pseudoephedrine  30 mg up to 240 mg /day. It can raise your blood pressure and give you palpitations.     Use mucinex (guaifenisin) to break up mucous up to 2400mg/day to loosen any mucous.   The mucinex DM pill has a cough suppressant that can be sedating. It can be used at night to stop the tickle at the back of your throat.  You can use Mucinex D (it has guaifenesin and a high dose of pseudoephedrine) in the mornings to help decongest.        Use Flonase 1-2 sprays/nostril per day. It is a local acting steroid nasal spray, if you develop a bloody nose, stop using the medication immediately.     Sometimes Nyquil at night  is beneficial to help you get some rest, however it is sedating and it does have an antihistamine, and tylenol.     Honey is a natural cough suppressant that can be used.     Tylenol up to 4,000 mg a day is safe for short periods and can be used for body aches, pain, and fever. However in high doses and prolonged use it can cause liver irritation.     Ibuprofen is a non-steroidal anti-inflammatory that can be used for body aches, pain, and fever.However it can also cause stomach irritation if over used.

## 2025-07-11 NOTE — PROGRESS NOTES
"Subjective:      Patient ID: Daniella Roa is a 25 y.o. female.    Vitals:  height is 5' 8" (1.727 m) and weight is 90.7 kg (200 lb). Her oral temperature is 99.3 °F (37.4 °C). Her blood pressure is 125/87 and her pulse is 88. Her respiration is 18 and oxygen saturation is 98%.     Chief Complaint: Sore Throat (Entered by patient)    Pt presents today with c/o sore throat x's 1 day. Pt has taken otc meds for sx with no relief. Pt has no known exp. Pain level 7/10.    Sore Throat   This is a new problem. The current episode started in the past 7 days. The problem has been unchanged. Neither side of throat is experiencing more pain than the other. There has been no fever. The pain is at a severity of 7/10. The pain is moderate. Associated symptoms include congestion, coughing, ear pain and trouble swallowing. Pertinent negatives include no diarrhea or vomiting. She has had no exposure to strep or mono. She has tried NSAIDs for the symptoms. The treatment provided no relief.       Constitution: Negative. Negative for chills, sweating and fatigue.   HENT:  Positive for ear pain, congestion, sore throat and trouble swallowing. Negative for facial swelling.    Neck: Negative for painful lymph nodes.   Cardiovascular: Negative.  Negative for chest trauma, chest pain and sob on exertion.   Eyes: Negative.  Negative for eye itching and eye pain.   Respiratory:  Positive for cough. Negative for chest tightness and asthma.    Gastrointestinal: Negative.  Negative for nausea, vomiting and diarrhea.   Endocrine: negative. cold intolerance and excessive thirst.   Genitourinary: Negative.  Negative for dysuria, frequency, urgency and hematuria.   Musculoskeletal:  Negative for pain, trauma and joint pain.   Skin: Negative.  Negative for rash, wound and hives.   Allergic/Immunologic: Negative.  Negative for eczema, asthma, hives and itching.   Neurological: Negative.  Negative for disorientation and altered mental status. "   Hematologic/Lymphatic: Negative.  Negative for swollen lymph nodes.   Psychiatric/Behavioral: Negative.  Negative for altered mental status, disorientation and confusion.       Objective:     Physical Exam   Constitutional: She is oriented to person, place, and time. She appears well-developed. She is cooperative.  Non-toxic appearance. She does not appear ill. No distress.   HENT:   Head: Normocephalic and atraumatic.   Ears:   Right Ear: Hearing, external ear and ear canal normal. There is tenderness. Tympanic membrane is injected and erythematous. no impacted cerumen  Left Ear: Hearing, tympanic membrane, external ear and ear canal normal. no impacted cerumen  Nose: Nose normal. No mucosal edema or nasal deformity. No epistaxis. Right sinus exhibits no maxillary sinus tenderness and no frontal sinus tenderness. Left sinus exhibits no maxillary sinus tenderness and no frontal sinus tenderness.   Mouth/Throat: Uvula is midline, oropharynx is clear and moist and mucous membranes are normal. No trismus in the jaw. Normal dentition. No uvula swelling. No posterior oropharyngeal edema.   Eyes: Conjunctivae and lids are normal. No scleral icterus.   Neck: Trachea normal and phonation normal. Neck supple. No edema present. No erythema present. No neck rigidity present.   Cardiovascular: Normal rate, regular rhythm, normal heart sounds and normal pulses.   Pulmonary/Chest: Effort normal and breath sounds normal. No stridor. No respiratory distress. She has no decreased breath sounds. She has no wheezes. She has no rhonchi. She has no rales. She exhibits no tenderness.   Abdominal: Normal appearance.   Musculoskeletal: Normal range of motion.         General: No deformity. Normal range of motion.   Neurological: no focal deficit. She is alert, oriented to person, place, and time and at baseline. She exhibits normal muscle tone. Coordination normal.   Skin: Skin is warm, dry, intact, not diaphoretic and not pale.    Psychiatric: Her speech is normal and behavior is normal. Mood, judgment and thought content normal.   Nursing note and vitals reviewed.    Results for orders placed or performed in visit on 07/11/25   SARS Coronavirus 2 Antigen, POCT Manual Read    Collection Time: 07/11/25 12:28 PM   Result Value Ref Range    SARS Coronavirus 2 Antigen Positive (A) Negative, Presumptive Negative     Acceptable Yes    POCT Strep A, Molecular    Collection Time: 07/11/25 12:34 PM   Result Value Ref Range    Molecular Strep A, POC Negative Negative     Acceptable Yes          Assessment:     1. COVID-19 virus infection    2. Sore throat    3. Right otitis media, unspecified otitis media type        Plan:   Discussed positive COVID test with patient.  All hx was provided by the pt or available as part of established EMR. The pt past medical hx, family hx, social hx, and current medications were reviewed. Interpretation of diagnostics performed today were discussed. Tx discussed. Quarantine recommendations based on CDC guidelines discussed. Pt may follow up with [facility] for any concern. ER precautions. Pt voiced understanding of all discussed, and agreed.    Some portions of the physical exam were omitted to reduce chances of viral infection transmission.     Patient's chart reviewed in detail including all recent visits, pertinent medical history, medications, allergies and recent labs prior to prescribing medications.     FOLLOWUP  Follow up if symptoms worsen or fail to improve, for PLEASE CONTACT PCP OR CONTACT THE EMERGENCY ROOM..     PATIENT INSTRUCTIONS  Patient Instructions   INSTRUCTIONS:  - Rest.  - Drink plenty of fluids.  - Take Tylenol and/or Ibuprofen as directed as needed for fever/pain.  Do not take more than the recommended dose.  - follow up with your PCP within the next 1-2 weeks as needed.  - You must understand that you have received an Urgent Care treatment only and that you may be  released before all of your medical problems are known or treated.   - You, the patient, will arrange for follow up care as instructed.   - If your condition worsens or fails to improve we recommend that you receive another evaluation at the ER immediately or contact your PCP to discuss your concerns.   - You can call [phone] or [phone] to help schedule an appointment with the appropriate provider.     -If you smoke cigarettes, it would be beneficial for you to stop.    OVER THE COUNTER RECOMMENDATIONS/SUGGESTIONS.     Make sure to stay well hydrated.     Use Nasal Saline to mechanically move any post nasal drip from your eustachian tube or from the back of your throat.     Use warm salt water gargles to ease your throat pain. Warm salt water gargles as needed for sore throat-  1/2 tsp salt to 1 cup warm water, gargle as desired.     Use an antihistamine such as Claritin, Zyrtec or Allegra to dry you out.      Use pseudoephedrine (behind the counter) to decongest. Pseudoephedrine  30 mg up to 240 mg /day. It can raise your blood pressure and give you palpitations.     Use mucinex (guaifenisin) to break up mucous up to 2400mg/day to loosen any mucous.   The mucinex DM pill has a cough suppressant that can be sedating. It can be used at night to stop the tickle at the back of your throat.  You can use Mucinex D (it has guaifenesin and a high dose of pseudoephedrine) in the mornings to help decongest.        Use Flonase 1-2 sprays/nostril per day. It is a local acting steroid nasal spray, if you develop a bloody nose, stop using the medication immediately.     Sometimes Nyquil at night is beneficial to help you get some rest, however it is sedating and it does have an antihistamine, and tylenol.     Honey is a natural cough suppressant that can be used.     Tylenol up to 4,000 mg a day is safe for short periods and can be used for body aches, pain, and fever. However in high doses and prolonged use it can  cause liver irritation.     Ibuprofen is a non-steroidal anti-inflammatory that can be used for body aches, pain, and fever.However it can also cause stomach irritation if over used.         Thank you for allowing me to participate in your health care,     FNP-C     COVID-19 virus infection    Sore throat  -     POCT Strep A, Molecular  -     SARS Coronavirus 2 Antigen, POCT Manual Read    Right otitis media, unspecified otitis media type  -     amoxicillin (AMOXIL) 500 MG Tab; Take 1 tablet (500 mg total) by mouth every 12 (twelve) hours. for 7 days  Dispense: 14 tablet; Refill: 0